# Patient Record
Sex: FEMALE | URBAN - METROPOLITAN AREA
[De-identification: names, ages, dates, MRNs, and addresses within clinical notes are randomized per-mention and may not be internally consistent; named-entity substitution may affect disease eponyms.]

---

## 2017-09-26 ENCOUNTER — TRANSFERRED RECORDS (OUTPATIENT)
Dept: HEALTH INFORMATION MANAGEMENT | Facility: CLINIC | Age: 82
End: 2017-09-26

## 2017-11-10 ENCOUNTER — TELEPHONE (OUTPATIENT)
Dept: DERMATOLOGY | Facility: CLINIC | Age: 82
End: 2017-11-10

## 2017-11-10 NOTE — TELEPHONE ENCOUNTER
Patient's son called back and I was able to get Karon in sooner due to a cancelation on 11/16/17.  Her son will contact [facility] and have the records faxed to us prior to her appointment.    Address provided as they are coming from out of town.  Evelyn Abdi RN

## 2017-11-10 NOTE — TELEPHONE ENCOUNTER
Message received from Dr. Wright about getting this patient in for urticaria.  I notified her the soonest I have is 11/30/17 with Dr. Oreilly and she is ok with this.      I left a message for patient to call [facility].  I scheduled her with Dr. Oreilly on 11/30/17 at 8:00 am.  Also, records are needed from [facility], Lizett Plaza NP.    Evelyn Abdi RN

## 2017-11-16 ENCOUNTER — OFFICE VISIT (OUTPATIENT)
Dept: DERMATOLOGY | Facility: CLINIC | Age: 82
End: 2017-11-16
Payer: MEDICARE

## 2017-11-16 ENCOUNTER — RADIANT APPOINTMENT (OUTPATIENT)
Dept: GENERAL RADIOLOGY | Facility: CLINIC | Age: 82
End: 2017-11-16
Attending: DERMATOLOGY
Payer: MEDICARE

## 2017-11-16 DIAGNOSIS — L57.0 AK (ACTINIC KERATOSIS): ICD-10-CM

## 2017-11-16 DIAGNOSIS — L29.9 CHRONIC PRURITUS: ICD-10-CM

## 2017-11-16 DIAGNOSIS — L29.9 CHRONIC PRURITUS: Primary | ICD-10-CM

## 2017-11-16 DIAGNOSIS — D72.10 EOSINOPHILIA: ICD-10-CM

## 2017-11-16 LAB
ALBUMIN SERPL-MCNC: 3.9 G/DL (ref 3.4–5)
ALP SERPL-CCNC: 89 U/L (ref 40–150)
ALT SERPL W P-5'-P-CCNC: 49 U/L (ref 0–50)
ANION GAP SERPL CALCULATED.3IONS-SCNC: 7 MMOL/L (ref 3–14)
AST SERPL W P-5'-P-CCNC: 50 U/L (ref 0–45)
BASOPHILS # BLD AUTO: 0 10E9/L (ref 0–0.2)
BASOPHILS NFR BLD AUTO: 0.5 %
BILIRUB SERPL-MCNC: 0.6 MG/DL (ref 0.2–1.3)
BUN SERPL-MCNC: 24 MG/DL (ref 7–30)
CALCIUM SERPL-MCNC: 9.2 MG/DL (ref 8.5–10.1)
CHLORIDE SERPL-SCNC: 102 MMOL/L (ref 94–109)
CO2 SERPL-SCNC: 29 MMOL/L (ref 20–32)
CREAT SERPL-MCNC: 0.35 MG/DL (ref 0.52–1.04)
DIFFERENTIAL METHOD BLD: ABNORMAL
EOSINOPHIL # BLD AUTO: 1.3 10E9/L (ref 0–0.7)
EOSINOPHIL NFR BLD AUTO: 19.1 %
ERYTHROCYTE [DISTWIDTH] IN BLOOD BY AUTOMATED COUNT: 13 % (ref 10–15)
FERRITIN SERPL-MCNC: 90 NG/ML (ref 8–252)
GFR SERPL CREATININE-BSD FRML MDRD: >90 ML/MIN/1.7M2
GLUCOSE SERPL-MCNC: 88 MG/DL (ref 70–99)
HCT VFR BLD AUTO: 38.6 % (ref 35–47)
HGB BLD-MCNC: 12.6 G/DL (ref 11.7–15.7)
LYMPHOCYTES # BLD AUTO: 1.8 10E9/L (ref 0.8–5.3)
LYMPHOCYTES NFR BLD AUTO: 27 %
MCH RBC QN AUTO: 31.1 PG (ref 26.5–33)
MCHC RBC AUTO-ENTMCNC: 32.6 G/DL (ref 31.5–36.5)
MCV RBC AUTO: 95 FL (ref 78–100)
MONOCYTES # BLD AUTO: 0.6 10E9/L (ref 0–1.3)
MONOCYTES NFR BLD AUTO: 9.5 %
NEUTROPHILS # BLD AUTO: 2.9 10E9/L (ref 1.6–8.3)
NEUTROPHILS NFR BLD AUTO: 43.9 %
PLATELET # BLD AUTO: 215 10E9/L (ref 150–450)
POTASSIUM SERPL-SCNC: 4 MMOL/L (ref 3.4–5.3)
PROT SERPL-MCNC: 8.1 G/DL (ref 6.8–8.8)
RBC # BLD AUTO: 4.05 10E12/L (ref 3.8–5.2)
SODIUM SERPL-SCNC: 138 MMOL/L (ref 133–144)
T4 FREE SERPL-MCNC: 0.93 NG/DL (ref 0.76–1.46)
TSH SERPL DL<=0.005 MIU/L-ACNC: 4.96 MU/L (ref 0.4–4)
WBC # BLD AUTO: 6.6 10E9/L (ref 4–11)

## 2017-11-16 PROCEDURE — 87070 CULTURE OTHR SPECIMN AEROBIC: CPT | Performed by: DERMATOLOGY

## 2017-11-16 PROCEDURE — 36415 COLL VENOUS BLD VENIPUNCTURE: CPT | Performed by: DERMATOLOGY

## 2017-11-16 PROCEDURE — 86803 HEPATITIS C AB TEST: CPT | Performed by: DERMATOLOGY

## 2017-11-16 PROCEDURE — 87389 HIV-1 AG W/HIV-1&-2 AB AG IA: CPT | Performed by: DERMATOLOGY

## 2017-11-16 PROCEDURE — 00000402 ZZHCL STATISTIC TOTAL PROTEIN: Performed by: DERMATOLOGY

## 2017-11-16 PROCEDURE — 88312 SPECIAL STAINS GROUP 1: CPT | Performed by: DERMATOLOGY

## 2017-11-16 PROCEDURE — 86706 HEP B SURFACE ANTIBODY: CPT | Performed by: DERMATOLOGY

## 2017-11-16 PROCEDURE — 88346 IMFLUOR 1ST 1ANTB STAIN PX: CPT | Mod: 90 | Performed by: DERMATOLOGY

## 2017-11-16 PROCEDURE — 84165 PROTEIN E-PHORESIS SERUM: CPT | Performed by: DERMATOLOGY

## 2017-11-16 PROCEDURE — 83516 IMMUNOASSAY NONANTIBODY: CPT | Mod: 90 | Performed by: DERMATOLOGY

## 2017-11-16 PROCEDURE — 80050 GENERAL HEALTH PANEL: CPT | Performed by: DERMATOLOGY

## 2017-11-16 PROCEDURE — 71020 XR CHEST 2 VW: CPT | Performed by: RADIOLOGY

## 2017-11-16 PROCEDURE — 11100 HC DESTRUCT PREMALIGNANT LESION, FIRST: CPT | Mod: 59 | Performed by: DERMATOLOGY

## 2017-11-16 PROCEDURE — 99213 OFFICE O/P EST LOW 20 MIN: CPT | Mod: 25 | Performed by: DERMATOLOGY

## 2017-11-16 PROCEDURE — 17000 DESTRUCT PREMALG LESION: CPT | Performed by: DERMATOLOGY

## 2017-11-16 PROCEDURE — 88350 IMFLUOR EA ADDL 1ANTB STN PX: CPT | Mod: 90 | Performed by: DERMATOLOGY

## 2017-11-16 PROCEDURE — 82728 ASSAY OF FERRITIN: CPT | Performed by: DERMATOLOGY

## 2017-11-16 PROCEDURE — G0499 HEPB SCREEN HIGH RISK INDIV: HCPCS | Performed by: DERMATOLOGY

## 2017-11-16 PROCEDURE — 99000 SPECIMEN HANDLING OFFICE-LAB: CPT | Performed by: DERMATOLOGY

## 2017-11-16 PROCEDURE — 88305 TISSUE EXAM BY PATHOLOGIST: CPT | Performed by: DERMATOLOGY

## 2017-11-16 PROCEDURE — 84439 ASSAY OF FREE THYROXINE: CPT | Performed by: DERMATOLOGY

## 2017-11-16 PROCEDURE — 88342 IMHCHEM/IMCYTCHM 1ST ANTB: CPT | Performed by: DERMATOLOGY

## 2017-11-16 RX ORDER — LEVOTHYROXINE SODIUM 50 UG/1
0.5 TABLET ORAL DAILY
COMMUNITY
Start: 2017-08-22

## 2017-11-16 RX ORDER — METOPROLOL SUCCINATE 25 MG/1
0.5 TABLET, EXTENDED RELEASE ORAL DAILY
COMMUNITY
Start: 2017-11-13

## 2017-11-16 RX ORDER — ALBUTEROL SULFATE 90 UG/1
AEROSOL, METERED RESPIRATORY (INHALATION) PRN
COMMUNITY
Start: 2017-07-11

## 2017-11-16 RX ORDER — SERTRALINE HYDROCHLORIDE 25 MG/1
25 TABLET, FILM COATED ORAL DAILY
COMMUNITY
Start: 2017-10-02

## 2017-11-16 RX ORDER — CALCIUM CARBONATE 500 MG/1
1 TABLET, CHEWABLE ORAL DAILY
COMMUNITY

## 2017-11-16 NOTE — LETTER
11/16/2017       RE: Karon Randall  [address] [place] [address]     Dear Colleague,    Thank you for referring your patient, Karon Randall, to the [facility]. Please see a copy of my visit note below.    [facility] D[facility]atology Note      Dermatology Problem List:  1.new patient   2. Hx of NMSC   -SCC, BCC  3. Hx of elevated eosinophils 2016  4. Hx of atopic dermatitis managed by [facility] dermatology  -S/p prednisone, kenalog, zyrtec,    5. Pustular reaction (acontholysis, eosinophils and neutrophils concerning for drug reactions) s/p biopsy 9/20/16 on the left lateral superior chest    -Negative DIF s/p biopsy 9/20/16 on the left medial superior chest   -Current Tx: Triamcinolone/cerave 50/50 mix BID   6. Urticaria s/p biopsy 2010    Encounter Date: Nov 16, 2017    CC:  Chief Complaint   Patient presents with     Derm Problem     Chronic Urticaria - has been steadily going on for about the past year - was diagnosed with eczema about 8 years ago; Hx of NMSC         History of Present Illness:  Ms. Karon Randall is a 88 year old female with a history of NMSC and atopic dermitis who presents as new patient for rash. The patient has had this condition for the past year and she was diagnosed with eczema 8 years ago. The patient reports this rash is itchy and has a history of asthma, atopic dermitis and is being managed by [facility] dermatitis. The patient notes that this rash has not been improving for 10 years and is very pruritic and was referred here from [place] because they were unable to help.  She used olive oil for a few weeks daily and this did not help. The patient reports bumps, however she denies pus filled bumps. The patient notes that her rash went to her buttocks. Her scalp has been pruritic as well. She notes that this rash is aggravated with heat. The topical steroids have not been used recently, but seemed to aggravate her  rash as well.   Cirtolene and metroprolol was started in the last year. The pateitn also notes scabbing that seem to travels across her arms and ribs.   She does not enjoy the Vanicream feeling, but likes using Cerave.   She presents today with her son.      No other lesions reported.     Past Medical History:   There is no problem list on file for this patient.    No past medical history on file.  No past surgical history on file.    Social History:  The patient is retired. The patient denies use of tanning beds.    Family History:  There is a family history of non-melanoma skin cancer in the patient's family. The patient has a family history of hay fever.     Medications:  Current Outpatient Prescriptions   Medication Sig Dispense Refill     PROAIR  (90 BASE) MCG/ACT inhaler as needed       levothyroxine (SYNTHROID/LEVOTHROID) 50 MCG tablet 0.5 tablets by Oral or Feeding Tube route daily        metoprolol (TOPROL-XL) 25 MG 24 hr tablet Take 0.5 tablets by mouth daily        sertraline (ZOLOFT) 25 MG tablet Take 25 mg by mouth daily        VITAMIN D, CHOLECALCIFEROL, PO Take by mouth daily       calcium carbonate (TUMS) 500 MG chewable tablet Take 1 chew tab by mouth daily       LORAZEPAM PO Take 0.5 mg by mouth daily       Probiotic Product (PROBIOTIC DAILY PO)        Cetirizine HCl (ZYRTEC ALLERGY PO)          Allergies   Allergen Reactions     Codeine Nausea and Vomiting     Sulfa Drugs Unknown       Review of Systems:  -Constitutional: The patient denies fatigue, fevers, chills, and night sweats.  -HEENT: Patient denies nonhealing oral sores.  -GI: The patient has GI upset more frequently recently.   -Skin: As above in HPI. No additional skin concerns.    Physical exam:  Vitals: There were no vitals taken for this visit.  GEN: This is a well developed, well-nourished female in no acute distress, in a pleasant mood.    SKIN: Total skin excluding the undergarment areas was performed. The exam included the  head/face, neck, both arms, chest, back, abdomen, both legs, digits and/or nails.   -There are waxy stuck on tan to brown papules on the trunk.  -There are erythematous macules with overyling adherent scale on the right cheek.  -Excoriations on the trunk   -There are erythematous patches with central erosion and bilateral flanks   -Similar lesions on the buttocks.   -Erythematous patches on the bilateral lower legs with an erosions on the left lower leg.  -Pitting +2 edema on the bilateral lower legs  Macular erythema that is tender on the lower legs.   -Eczamtous patch on the left lower thigh.     -No other lesions of concern on areas examined.       Impression/Plan:  1. History of nonmelanoma skin cancer    ABCDs of melanoma were discussed and self skin checks were advised.   2. History of atopic dermatitis s/p biopsy x2 left lateral superior chest(postive for pustular dermitis), left medial superior chest (negative immunofluorescence)     Hold Triamcinolone and Cereve 50/50      Continue Cerave ointment    3. Actinic Keratosis, right cheek   Cryotherapy procedure note: After verbal consent and discussion of risks and benefits including but no limited to dyspigmentation/scar, blister, and pain, 1was(were) treated with 1-2mm freeze border for 2 cycles with liquid nitrogen. Post cryotherapy instructions were provided.   4. Rash, entire body - Right Shoulder    Punch biopsy:  After discussion of benefits and risks including but not limited to bleeding/bruising, pain/swelling, infection, scar, incomplete removal, nerve damage/numbness, recurrence, and non-diagnostic biopsy, written consent, verbal consent and photographs were obtained. Time-out was performed. The area was cleaned with isopropyl alcohol. 0.5 mL of 2% lidocaine with 1:100,000 epinephrine was injected to obtain adequate anesthesia of the lesion on the right upper arm. A 4 mm punch biopsy was performed.  4-0 ethilon sutures were utilized to approximate  the epidermal edges.  White petroleum jelly/VaselineTM and a bandage was applied to the wound.  Explicit verbal and written wound care instructions were provided.  The patient left the Dermatology Clinic in good condition. The patient was counseled to follow up for suture removal in approximately 11-14 days.    Pruritus work up to be obtained and as follows: CBC with diff, LFTs, alk phosph, bili, LDH, hepatitis C and B testing, HIV, TSH with reflex T4, ferritin, SPEP, BUN/CR, and CXR.   5. Stasis dermitis with ID of the lower legs      Culture taken today     Discussion of side effects and venous insufficiency    Compression stockings recommneded during the day, remove nightly    Start Clobetasol 0.05% Cream twice daily for 2 weeks; then apply Cerave on top     Elevate legs as often as possible    6. Feeling cold in right leg    Recommended to follow up with primary care     Follow-up in 2 weeks , earlier for new or changing lesions.     Staff Involved:  Scribe/Staff    Scribe Disclosure:   I, Saundra Cee, am serving as a scribe to document services personally performed by Dr. Teodora Oreilly, based on data collection and the provider's statements to me.            Again, thank you for allowing me to participate in the care of your patient.      Sincerely,    Teodora Oreilly MD

## 2017-11-16 NOTE — PATIENT INSTRUCTIONS
Wound Care After a Biopsy    What is a skin biopsy?  A skin biopsy allows the doctor to examine a very small piece of tissue under the microscope to determine the diagnosis and the best treatment for the skin condition. A local anesthetic (numbing medicine)  is injected with a very small needle into the skin area to be tested. A small piece of skin is taken from the area. Sometimes a suture (stitch) is used.     What are the risks of a skin biopsy?  I will experience scar, bleeding, swelling, pain, crusting and redness. I may experience incomplete removal or recurrence. Risks of this procedure are excessive bleeding, bruising, infection, nerve damage, numbness, thick (hypertrophic or keloidal) scar and non-diagnostic biopsy.    How should I care for my wound for the first 24 hours?    Keep the wound dry and covered for 24 hours    If it bleeds, hold direct pressure on the area for 15 minutes. If bleeding does not stop then go to the emergency room    Avoid strenuous exercise the first 1-2 days or as your doctor instructs you    How should I care for the wound after 24 hours?    After 24 hours, remove the bandage    You may bathe or shower as normal    If you had a scalp biopsy, you can shampoo as usual and can use shower water to clean the biopsy site daily    Clean the wound twice a day with gentle soap and water    Do not scrub, be gentle    Apply white petroleum/Vaseline after cleaning the wound with a cotton swab or a clean finger, and keep the site covered with a Bandaid /bandage. Bandages are not necessary with a scalp biopsy    If you are unable to cover the site with a Bandaid /bandage, re-apply ointment 2-3 times a day to keep the site moist. Moisture will help with healing    Avoid strenuous activity for first 1-2 days    Avoid lakes, rivers, pools, and oceans until the stitches are removed or the site is healed    How do I clean my wound?    Wash hands thoroughly with soap or use hand  before all  wound care    Clean the wound with gentle soap and water    Apply white petroleum/Vaseline  to wound after it is clean    Replace the Bandaid /bandage to keep the wound covered for the first few days or as instructed by your doctor    If you had a scalp biopsy, warm shower water to the area on a daily basis should suffice    What should I use to clean my wound?     Cotton-tipped applicators (Qtips )    White petroleum jelly (Vaseline ). Use a clean new container and use Q-tips to apply.    Bandaids   as needed    Gentle soap     How should I care for my wound long term?    Do not get your wound dirty    Keep up with wound care for one week or until the area is healed.    A small scab will form and fall off by itself when the area is completely healed. The area will be red and will become pink in color as it heals. Sun protection is very important for how your scar will turn out. Sunscreen with an SPF 30 or greater is recommended once the area is healed.    If you have stitches, stitches need to be removed in 10-14  days. You may return to our clinic for this or you may have it done locally at your doctor s office.    You should have some soreness but it should be mild and slowly go away over several days. Talk to your doctor about using tylenol for pain,    When should I call my doctor?  If you have increased:     Pain or swelling    Pus or drainage (clear or slightly yellow drainage is ok)    Temperature over 100F    Spreading redness or warmth around wound    When will I hear about my results?  The biopsy results can take 2-3 weeks to come back. The clinic will call you with the results, send you a Melinta message, or have you schedule a follow-up clinic or phone time to discuss the results. Contact our clinics if you do not hear from us in 3 weeks.     Cryotherapy    What is it?    Use of a very cold liquid, such as liquid nitrogen, to freeze and destroy abnormal skin cells that need to be removed    What should  I expect?    Tenderness and redness    A small blister that might grow and fill with dark purple blood. There may be crusting.    More than one treatment may be needed if the lesions do not go away.    How do I care for the treated area?    Gently wash the area with your hands when bathing.    Use a thin layer of Vaseline to help with healing. You may use a Band-Aid.     The area should heal within 7-10 days and may leave behind a pink or lighter color.     Do not use an antibiotic or Neosporin ointment.     You may take acetaminophen (Tylenol) for pain.     Call your Doctor if you have:    Severe pain    Signs of infection (warmth, redness, cloudy yellow drainage, and or a bad smell)    Questions or concerns    Who should I call with questions?       [facility]: [phone]       [facility]: [phone]       For urgent needs outside of business hours call the [facility] at [phone]        and ask for the dermatology resident on call    Who should I call with questions?    [facility]: [phone]     [facility]: [phone]    For urgent needs outside of business hours call the [facility] at [phone] and ask for the dermatology resident on call

## 2017-11-16 NOTE — MR AVS SNAPSHOT
After Visit Summary   11/16/2017    Karon Randall    MRN: [id number]           Patient Information     Date Of Birth          10/4/1929        Visit Information        Provider Department      11/16/2017 1:45 PM Teodora Oreilly MD [facility]        Care Instructions    Wound Care After a Biopsy    What is a skin biopsy?  A skin biopsy allows the doctor to examine a very small piece of tissue under the microscope to determine the diagnosis and the best treatment for the skin condition. A local anesthetic (numbing medicine)  is injected with a very small needle into the skin area to be tested. A small piece of skin is taken from the area. Sometimes a suture (stitch) is used.     What are the risks of a skin biopsy?  I will experience scar, bleeding, swelling, pain, crusting and redness. I may experience incomplete removal or recurrence. Risks of this procedure are excessive bleeding, bruising, infection, nerve damage, numbness, thick (hypertrophic or keloidal) scar and non-diagnostic biopsy.    How should I care for my wound for the first 24 hours?    Keep the wound dry and covered for 24 hours    If it bleeds, hold direct pressure on the area for 15 minutes. If bleeding does not stop then go to the emergency room    Avoid strenuous exercise the first 1-2 days or as your doctor instructs you    How should I care for the wound after 24 hours?    After 24 hours, remove the bandage    You may bathe or shower as normal    If you had a scalp biopsy, you can shampoo as usual and can use shower water to clean the biopsy site daily    Clean the wound twice a day with gentle soap and water    Do not scrub, be gentle    Apply white petroleum/Vaseline after cleaning the wound with a cotton swab or a clean finger, and keep the site covered with a Bandaid /bandage. Bandages are not necessary with a scalp biopsy    If you are unable to cover the site with a Bandaid /bandage, re-apply ointment 2-3 times a  day to keep the site moist. Moisture will help with healing    Avoid strenuous activity for first 1-2 days    Avoid lakes, rivers, pools, and oceans until the stitches are removed or the site is healed    How do I clean my wound?    Wash hands thoroughly with soap or use hand  before all wound care    Clean the wound with gentle soap and water    Apply white petroleum/Vaseline  to wound after it is clean    Replace the Bandaid /bandage to keep the wound covered for the first few days or as instructed by your doctor    If you had a scalp biopsy, warm shower water to the area on a daily basis should suffice    What should I use to clean my wound?     Cotton-tipped applicators (Qtips )    White petroleum jelly (Vaseline ). Use a clean new container and use Q-tips to apply.    Bandaids   as needed    Gentle soap     How should I care for my wound long term?    Do not get your wound dirty    Keep up with wound care for one week or until the area is healed.    A small scab will form and fall off by itself when the area is completely healed. The area will be red and will become pink in color as it heals. Sun protection is very important for how your scar will turn out. Sunscreen with an SPF 30 or greater is recommended once the area is healed.    If you have stitches, stitches need to be removed in 10-14  days. You may return to our clinic for this or you may have it done locally at your doctor s office.    You should have some soreness but it should be mild and slowly go away over several days. Talk to your doctor about using tylenol for pain,    When should I call my doctor?  If you have increased:     Pain or swelling    Pus or drainage (clear or slightly yellow drainage is ok)    Temperature over 100F    Spreading redness or warmth around wound    When will I hear about my results?  The biopsy results can take 2-3 weeks to come back. The clinic will call you with the results, send you a [employer] message, or  have you schedule a follow-up clinic or phone time to discuss the results. Contact our clinics if you do not hear from us in 3 weeks.     Cryotherapy    What is it?    Use of a very cold liquid, such as liquid nitrogen, to freeze and destroy abnormal skin cells that need to be removed    What should I expect?    Tenderness and redness    A small blister that might grow and fill with dark purple blood. There may be crusting.    More than one treatment may be needed if the lesions do not go away.    How do I care for the treated area?    Gently wash the area with your hands when bathing.    Use a thin layer of Vaseline to help with healing. You may use a Band-Aid.     The area should heal within 7-10 days and may leave behind a pink or lighter color.     Do not use an antibiotic or Neosporin ointment.     You may take acetaminophen (Tylenol) for pain.     Call your Doctor if you have:    Severe pain    Signs of infection (warmth, redness, cloudy yellow drainage, and or a bad smell)    Questions or concerns    Who should I call with questions?       [facility]: [phone]       [facility]: [phone]       For urgent needs outside of business hours call the [facility] at [phone]        and ask for the dermatology resident on call    Who should I call with questions?    [facility]: [phone]     [facility]: [phone]    For urgent needs outside of business hours call the [facility] at [phone] and ask for the dermatology resident on call            Follow-ups after your visit        Follow-up notes from your care team     Return if symptoms worsen or fail to improve.      Your next 10 appointments already scheduled     Feb 13, 2018 11:30 AM CST   (Arrive by 11:15 AM)   New Patient Visit with Maurice Angel MD   [facility] ([facility])    [address]  [address] [place] [address] [phone]              Who to contact     If you have questions or need follow up information about today's clinic visit or your schedule please contact [facility] directly at [phone].  Normal or non-critical lab and imaging results will be communicated to you by MyChart, letter or phone within 4 business days after the clinic has received the results. If you do not hear from us within 7 days, please contact the clinic through MyChart or phone. If you have a critical or abnormal lab result, we will notify you by phone as soon as possible.  Submit refill requests through [employer] or call your pharmacy and they will forward the refill request to us. Please allow 3 business days for your refill to be completed.          Additional Information About Your Visit        MyChart Information     [employer] is an electronic gateway that provides easy, online access to your medical records. With [employer], you can request a clinic appointment, read your test results, renew a prescription or communicate with your care team.     To sign up for [employer] visit the website at [email or web address]   You will be asked to enter the access code listed below, as well as some personal information. Please follow the directions to create your username and password.     Your access code is: [email or web address]  Expires: 2018  3:31 PM     Your access code will  in 90 days. If you need help or a new code, please contact your [facility] or call [phone] for assistance.        Care EveryWhere ID     This is your Care EveryWhere ID. This could be used by other organizations to access your [facility] medical records  [id number]         Blood Pressure from Last 3 Encounters:   No data found for BP    Weight from Last 3 Encounters:   No data found for Wt              Today, you had the following     No  orders found for display       Primary Care Provider    Physician No Ref-Primary       NO REF-PRIMARY PHYSICIAN        Equal Access to Services     EVAN BAIRDALMAS : Hadii aad ku hadpamelanabeel Radhaneelam, wamichelleda lusuzetteha, octaviota lalitacadenceashely randolph, nadeem glovermarquishank tyler. So [facility] [phone].    ATENCIÓN: Si habla español, tiene a calvert disposición servicios gratuitos de asistencia lingüística. Llame al [phone].    We comply with applicable federal civil rights laws and [place] laws. We do not discriminate on the basis of race, color, national origin, age, disability, sex, sexual orientation, or gender identity.            Thank you!     Thank you for choosing [facility]  for your care. Our goal is always to provide you with excellent care. Hearing back from our patients is one way we can continue to improve our services. Please take a few minutes to complete the written survey that you may receive in the mail after your visit with us. Thank you!             Your Updated Medication List - Protect others around you: Learn how to safely use, store and throw away your medicines at www.disposemymeds.org.          This list is accurate as of: 11/16/17  4:33 PM.  Always use your most recent med list.                   Brand Name Dispense Instructions for use Diagnosis    calcium carbonate 500 MG chewable tablet    TUMS     Take 1 chew tab by mouth daily        levothyroxine 50 MCG tablet    SYNTHROID/LEVOTHROID     0.5 tablets by Oral or Feeding Tube route daily        LORAZEPAM PO      Take 0.5 mg by mouth daily        metoprolol 25 MG 24 hr tablet    TOPROL-XL     Take 0.5 tablets by mouth daily        PROAIR  (90 BASE) MCG/ACT Inhaler   Generic drug:  albuterol      as needed        PROBIOTIC DAILY PO           sertraline 25 MG tablet    ZOLOFT     Take 25 mg by mouth daily        VITAMIN D (CHOLECALCIFEROL) PO      Take by mouth daily        ZYRTEC ALLERGY PO

## 2017-11-16 NOTE — LETTER
Date:December 1, 2017      Patient was self referred, no letter generated. Do not send.        [facility] Information

## 2017-11-16 NOTE — NURSING NOTE
Dermatology Rooming Note    Karon Randall's goals for this visit include:   Chief Complaint   Patient presents with     Derm Problem     Chronic Urticaria - has been steadily going on for about the past year - was diagnosed with eczema about 8 years ago; Hx of NMSC       Is a scribe okay for this visit: YES    Are records needed for this visit(If yes, obtain release of information): NO     Vitals: There were no vitals taken for this visit.    Referring Provider:  No referring provider defined for this encounter.    [place]ica [place], CMA

## 2017-11-16 NOTE — PROGRESS NOTES
[facility] Dermatology Note      Dermatology Problem List:  1.new patient   2. Hx of NMSC   -SCC, BCC  3. Hx of elevated eosinophils 2016  4. Hx of atopic dermatitis managed by [facility] dermatology  -S/p prednisone, kenalog, zyrtec,    5. Pustular reaction (acontholysis, eosinophils and neutrophils concerning for drug reactions) s/p biopsy 9/20/16 on the left lateral superior chest    -Negative DIF s/p biopsy 9/20/16 on the left medial superior chest   -Current Tx: Triamcinolone/cerave 50/50 mix BID   6. Urticaria s/p biopsy 2010    Encounter Date: Nov 16, 2017    CC:  Chief Complaint   Patient presents with     Derm Problem     Chronic Urticaria - has been steadily going on for about the past year - was diagnosed with eczema about 8 years ago; Hx of NMSC         History of Present Illness:  Ms. Karon Randall is a 88 year old female with a history of NMSC and atopic dermitis who presents as new patient for rash. The patient has had this condition for the past year and she was diagnosed with eczema 8 years ago. The patient reports this rash is itchy and has a history of asthma, atopic dermitis and is being managed by [facility] dermatitis. The patient notes that this rash has not been improving for 10 years and is very pruritic and was referred here from [place] because they were unable to help.  She used olive oil for a few weeks daily and this did not help. The patient reports bumps, however she denies pus filled bumps. The patient notes that her rash went to her buttocks. Her scalp has been pruritic as well. She notes that this rash is aggravated with heat. The topical steroids have not been used recently, but seemed to aggravate her rash as well.   Cirtolene and metroprolol was started in the last year. The pateitn also notes scabbing that seem to travels across her arms and ribs.   She does not enjoy the Vanicream feeling, but likes using Cerave.   She presents today with her son.      No other  lesions reported.     Past Medical History:   There is no problem list on file for this patient.    No past medical history on file.  No past surgical history on file.    Social History:  The patient is retired. The patient denies use of tanning beds.    Family History:  There is a family history of non-melanoma skin cancer in the patient's family. The patient has a family history of hay fever.     Medications:  Current Outpatient Prescriptions   Medication Sig Dispense Refill     PROAIR  (90 BASE) MCG/ACT inhaler as needed       levothyroxine (SYNTHROID/LEVOTHROID) 50 MCG tablet 0.5 tablets by Oral or Feeding Tube route daily        metoprolol (TOPROL-XL) 25 MG 24 hr tablet Take 0.5 tablets by mouth daily        sertraline (ZOLOFT) 25 MG tablet Take 25 mg by mouth daily        VITAMIN D, CHOLECALCIFEROL, PO Take by mouth daily       calcium carbonate (TUMS) 500 MG chewable tablet Take 1 chew tab by mouth daily       LORAZEPAM PO Take 0.5 mg by mouth daily       Probiotic Product (PROBIOTIC DAILY PO)        Cetirizine HCl (ZYRTEC ALLERGY PO)          Allergies   Allergen Reactions     Codeine Nausea and Vomiting     Sulfa Drugs Unknown       Review of Systems:  -Constitutional: The patient denies fatigue, fevers, chills, and night sweats.  -HEENT: Patient denies nonhealing oral sores.  -GI: The patient has GI upset more frequently recently.   -Skin: As above in HPI. No additional skin concerns.    Physical exam:  Vitals: There were no vitals taken for this visit.  GEN: This is a well developed, well-nourished female in no acute distress, in a pleasant mood.    SKIN: Total skin excluding the undergarment areas was performed. The exam included the head/face, neck, both arms, chest, back, abdomen, both legs, digits and/or nails.   -There are waxy stuck on tan to brown papules on the trunk.  -There are erythematous macules with overyling adherent scale on the right cheek.  -Excoriations on the trunk   -There are  erythematous patches with central erosion and bilateral flanks   -Similar lesions on the buttocks.   -Erythematous patches on the bilateral lower legs with an erosions on the left lower leg.  -Pitting +2 edema on the bilateral lower legs  Macular erythema that is tender on the lower legs.   -Eczamtous patch on the left lower thigh.     -No other lesions of concern on areas examined.       Impression/Plan:  1. History of nonmelanoma skin cancer    ABCDs of melanoma were discussed and self skin checks were advised.   2. History of atopic dermatitis s/p biopsy x2 left lateral superior chest(postive for pustular dermitis), left medial superior chest (negative immunofluorescence)     Hold Triamcinolone and Cereve 50/50      Continue Cerave ointment    3. Actinic Keratosis, right cheek   Cryotherapy procedure note: After verbal consent and discussion of risks and benefits including but no limited to dyspigmentation/scar, blister, and pain, 1was(were) treated with 1-2mm freeze border for 2 cycles with liquid nitrogen. Post cryotherapy instructions were provided.   4. Rash-most likely stasis dermatitis with id but rule out BP    Punch biopsy:  After discussion of benefits and risks including but not limited to bleeding/bruising, pain/swelling, infection, scar, incomplete removal, nerve damage/numbness, recurrence, and non-diagnostic biopsy, written consent, verbal consent and photographs were obtained. Time-out was performed. The area was cleaned with isopropyl alcohol. 0.5 mL of 2% lidocaine with 1:100,000 epinephrine was injected to obtain adequate anesthesia of the lesion on the right upper arm. A 4 mm punch biopsy was performed.  4-0 ethilon sutures were utilized to approximate the epidermal edges.  White petroleum jelly/VaselineTM and a bandage was applied to the wound.  Explicit verbal and written wound care instructions were provided.  The patient left the Dermatology Clinic in good condition. The patient was  counseled to follow up for suture removal in approximately 11-14 days.    Pruritus work up to be obtained and as follows: CBC with diff, LFTs, alk phosph, bili, LDH, hepatitis C and B testing, HIV, TSH with reflex T4, ferritin, SPEP, BUN/CR, and CXR.       Culture taken today     Discussion of side effects and venous insufficiency    Compression stockings recommneded during the day, remove nightly    Start Clobetasol 0.05% Cream twice daily for 2 weeks; then apply Cerave on top     Elevate legs as often as possible    5. Feeling cold in right leg    Recommended to follow up with primary care     Follow-up in 2 weeks , earlier for new or changing lesions.     Staff Involved:  Scribe/Staff    Scribe Disclosure:   I, Saundra Cee, am serving as a scribe to document services personally performed by Dr. Teodora Oreilly, based on data collection and the provider's statements to me.     Provider Disclosure:   The documentation recorded by the scribe accurately reflects the services I personally performed and the decisions made by me.    Teodora Oreilly MD    Department of Dermatology  [facility] [place]: Phone: [phone], Fax:[phone]  [facility]: Phone: [phone], Fax: [phone]

## 2017-11-17 ENCOUNTER — TELEPHONE (OUTPATIENT)
Dept: DERMATOLOGY | Facility: CLINIC | Age: 82
End: 2017-11-17

## 2017-11-17 DIAGNOSIS — I87.2 STASIS DERMATITIS: Primary | ICD-10-CM

## 2017-11-17 LAB
ALBUMIN SERPL ELPH-MCNC: 4.1 G/DL (ref 3.7–5.1)
ALPHA1 GLOB SERPL ELPH-MCNC: 0.3 G/DL (ref 0.2–0.4)
ALPHA2 GLOB SERPL ELPH-MCNC: 1 G/DL (ref 0.5–0.9)
B-GLOBULIN SERPL ELPH-MCNC: 1 G/DL (ref 0.6–1)
GAMMA GLOB SERPL ELPH-MCNC: 1.3 G/DL (ref 0.7–1.6)
HBV SURFACE AB SERPL IA-ACNC: 3.45 M[IU]/ML
HBV SURFACE AG SERPL QL IA: NONREACTIVE
HCV AB SERPL QL IA: REACTIVE
HIV 1+2 AB+HIV1 P24 AG SERPL QL IA: NONREACTIVE
M PROTEIN SERPL ELPH-MCNC: 0 G/DL
PROT PATTERN SERPL ELPH-IMP: ABNORMAL

## 2017-11-17 RX ORDER — CLOBETASOL PROPIONATE 0.5 MG/G
CREAM TOPICAL
Qty: 160 G | Refills: 0 | Status: SHIPPED | OUTPATIENT
Start: 2017-11-17 | End: 2018-03-12

## 2017-11-17 NOTE — TELEPHONE ENCOUNTER
[facility] [place] [facility]    Phone Message    Name of Caller: Husam    Phone Number: Other phone number:  [phone]      Best time to return call: any    May a detailed message be left on voicemail: yes    Relation to patient: Other Name: son  Relationship: son  Is there legal documentation in chart to discuss information with this person: pharmacy listed, in [place].          Action Taken: Message routed to:  Adult Clinics: Dermatology p [id number]

## 2017-11-17 NOTE — TELEPHONE ENCOUNTER
Per note 11/16/17 Clobetasol 0.05% clobetasol was supposed to be sent in.  I spoke with Denilson over the phone and she is not able to access Epic and asked me to send a message to the RN, Lillian.  I asked her how big of a tube she wanted the patient to have and she said 160g.  I pended the prescription and sent to Lillian.    Chica Madera, CMA

## 2017-11-17 NOTE — TELEPHONE ENCOUNTER
Medication ordered and sent to selected pharmacy..Lillian Juarez RN        5. Stasis dermitis with ID of the lower legs      Culture taken today     Discussion of side effects and venous insufficiency    Compression stockings recommneded during the day, remove nightly    Start Clobetasol 0.05% Cream twice daily for 2 weeks; then apply Cerave on top     Elevate legs as often as possible    6. Feeling cold in right leg    Recommended to follow up with primary care      Follow-up in 2 weeks , earlier for new or changing lesions.      Staff Involved:  Scribe/Staff     Scribe Disclosure:   I, Saundra Cee, am serving as a scribe to document services personally performed by Dr. Teodora Oreilly, based on data collection and the provider's statements to me.

## 2017-11-18 LAB
BACTERIA SPEC CULT: NO GROWTH
Lab: NORMAL
SPECIMEN SOURCE: NORMAL

## 2017-11-27 LAB
COPATH REPORT: NORMAL
EPIDIS AB SER-ACNC: NORMAL

## 2017-12-04 ENCOUNTER — TELEPHONE (OUTPATIENT)
Dept: DERMATOLOGY | Facility: CLINIC | Age: 82
End: 2017-12-04

## 2017-12-04 NOTE — TELEPHONE ENCOUNTER
Husam Rondon, called and requested the blood lab orders be faxed to [facility] at [phone] and the stool sample orders be faxed to [facility] at [phone].    Orders faxed.  Evelyn Abdi RN

## 2017-12-08 ENCOUNTER — TRANSFERRED RECORDS (OUTPATIENT)
Dept: HEALTH INFORMATION MANAGEMENT | Facility: CLINIC | Age: 82
End: 2017-12-08

## 2017-12-10 ENCOUNTER — TRANSFERRED RECORDS (OUTPATIENT)
Dept: HEALTH INFORMATION MANAGEMENT | Facility: CLINIC | Age: 82
End: 2017-12-10

## 2017-12-21 ENCOUNTER — TELEPHONE (OUTPATIENT)
Dept: DERMATOLOGY | Facility: CLINIC | Age: 82
End: 2017-12-21

## 2017-12-21 DIAGNOSIS — D72.10 EOSINOPHILIA: Primary | ICD-10-CM

## 2017-12-21 DIAGNOSIS — I87.2 STASIS DERMATITIS: ICD-10-CM

## 2017-12-21 NOTE — TELEPHONE ENCOUNTER
I need more information. What is she curretnly doing for her skin? Is she using her cream? What percent better is it?    Does she want to come back? Does she want me to sign out the case to a more local dermatologist to help her settle this down?

## 2017-12-21 NOTE — TELEPHONE ENCOUNTER
I have all normal results except a peripheral smear result. If peripheral smear is normal, I do not have  Reason for why her allergic cells(eosinophils are elevated) and I recommended she see hematology.     She still has eczema from swelling on her legs that spread to her body. This does not change that diagnosis. Make sure she is improving with her medication regimen.

## 2017-12-21 NOTE — TELEPHONE ENCOUNTER
Husam Randall was calling regarding mother Karon Randall. He stated that lab results we to be received from [facility] and  [facility]. He stated that Dr. Oreilly was to call with those results. Forwarding to Dr. Oreilly to advise. He requested a call to the patient at the home number. Ok to leave detailed message.    Sena Joshi LPN

## 2017-12-21 NOTE — TELEPHONE ENCOUNTER
"Writer called and spoke with patient and gave her Dr. Oreilly's recommendations. When inquiring about her eczema, patient stated that was was \"doing well for a while\" and that the discomfort woke her up and that it's \"gotten worse\".     Writer explained that a message would be sent to Dr. Oreilly to update her on her condition and the clinic would call her if Dr. Oreilly recommended any changes.    Sena Joshi LPN    "

## 2017-12-22 NOTE — TELEPHONE ENCOUNTER
Peripheral smear results received and reviewed with Dr. Oreilly.  She would like patient to see hematology for eosinophilia.  Referral placed.  Patient can be seen locally for return to [place].    Dr. Oreilly is unable to correlate her eczema (proven on skin biopsy) with her elevated eosinophils.  Patient can follow up with Dr. Oreilly or Dr. Oreilly can transfer her care to a local dermatologist.    Evelyn Abdi RN

## 2017-12-22 NOTE — TELEPHONE ENCOUNTER
I left a message for patient and her son to call [facility].  [facility] lab is faxing over the peripheral smear result.  Evelyn Abdi RN

## 2017-12-26 NOTE — TELEPHONE ENCOUNTER
Called Husam.  He stated that the patient has been using her Clobetasol when she has flare-ups.  Her legs have been clear, but she is having flare-ups on her arms.  She has also been using Cerave.  She has been pretty good about wearing her compression stockings to keep the edema down.  He stated that, if possible, they would like to see a hematologist and a dermatologist in [place], since it is closer to home.  I informed him that I will send this message to Dr. Oreilly and figure out her recommendations.  He also asked about the Hepatitis C result.  I informed him of the same note that Evelyn spoke with them about.  I informed him that she needs to see her PCP in regards to those results.      I told him that once I hear back from Dr. Oreilly with her recommendations, I will print out all of the necessary information and mail it out to them.    He agreed with the plan and didn't have any additional questions.    Chica Madera, CMA

## 2017-12-27 NOTE — TELEPHONE ENCOUNTER
RN okay to put in order for clotrimazole 1% cream twice daily 45- 60 grams with 1 refill to hands for 2 months.

## 2017-12-27 NOTE — TELEPHONE ENCOUNTER
Dr. Jimenez in [place] has agreed to see patient. [facility] I havent gotten a response on her contact info

## 2017-12-28 NOTE — TELEPHONE ENCOUNTER
I signed this case out to Dr. Jimenez who will see patient.   [place] [address] [place] [id number]  Get Directions  New [phone]  Existing PatientsShow Number

## 2017-12-29 RX ORDER — CLOTRIMAZOLE 1 %
CREAM (GRAM) TOPICAL
Qty: 60 G | Refills: 1 | Status: SHIPPED | OUTPATIENT
Start: 2017-12-29

## 2017-12-29 NOTE — TELEPHONE ENCOUNTER
I left a message for patient to call [facility].  Clotrimazole Rx sent to [facility].  Evelyn Abdi RN

## 2018-01-04 NOTE — TELEPHONE ENCOUNTER
VM message received from Husam on 1/3/18 at 6:19 pm. He requested the dermatology records be faxed to Dr. Jimenez at [phone].  His mom will be seeing Hematology at [facility].  I spoke with their office and they requested I fax over the records and they call the patient/family to schedule.  Records faxed to [phone].  I notified Husam if he hasn't heard from them by end of day Monday to call them directly at [phone].    I encouraged Husam to call back if there was anything further he needed from us.    Evelyn Abdi RN

## 2018-03-12 DIAGNOSIS — L30.9 DERMATITIS: Primary | ICD-10-CM

## 2018-03-12 RX ORDER — CLOBETASOL PROPIONATE 0.5 MG/G
CREAM TOPICAL
Qty: 160 G | Refills: 0 | Status: SHIPPED | OUTPATIENT
Start: 2018-03-12

## 2018-03-12 NOTE — TELEPHONE ENCOUNTER
Forwarding to MD to review and advise on clobetasol refill.  Care transferred to Dr. Jimenez in [place].  Per 11/16/17 office visit with Dr. Oreilly:  Impression/Plan:  1. History of nonmelanoma skin cancer    ABCDs of melanoma were discussed and self skin checks were advised.   2. History of atopic dermatitis s/p biopsy x2 left lateral superior chest(postive for pustular dermitis), left medial superior chest (negative immunofluorescence)     Hold Triamcinolone and Cereve 50/50      Continue Cerave ointment    3. Actinic Keratosis, right cheek                          Cryotherapy procedure note: After verbal consent and discussion of risks and benefits including but no limited to dyspigmentation/scar, blister, and pain, 1was(were) treated with 1-2mm freeze border for 2 cycles with liquid nitrogen. Post cryotherapy instructions were provided.   4. Rash-most likely stasis dermatitis with id but rule out BP    Punch biopsy:  After discussion of benefits and risks including but not limited to bleeding/bruising, pain/swelling, infection, scar, incomplete removal, nerve damage/numbness, recurrence, and non-diagnostic biopsy, written consent, verbal consent and photographs were obtained. Time-out was performed. The area was cleaned with isopropyl alcohol. 0.5 mL of 2% lidocaine with 1:100,000 epinephrine was injected to obtain adequate anesthesia of the lesion on the right upper arm. A 4 mm punch biopsy was performed.  4-0 ethilon sutures were utilized to approximate the epidermal edges.  White petroleum jelly/VaselineTM and a bandage was applied to the wound.  Explicit verbal and written wound care instructions were provided.  The patient left the Dermatology Clinic in good condition. The patient was counseled to follow up for suture removal in approximately 11-14 days.    Pruritus work up to be obtained and as follows: CBC with diff, LFTs, alk phosph, bili, LDH, hepatitis C and B testing, HIV, TSH with reflex T4, ferritin,  SPEP, BUN/CR, and CXR.        Culture taken today     Discussion of side effects and venous insufficiency    Compression stockings recommneded during the day, remove nightly    Start Clobetasol 0.05% Cream twice daily for 2 weeks; then apply Cerave on top     Elevate legs as often as possible    5. Feeling cold in right leg    Recommended to follow up with primary care     Evelyn Abdi RN